# Patient Record
Sex: MALE | Race: WHITE | NOT HISPANIC OR LATINO | URBAN - METROPOLITAN AREA
[De-identification: names, ages, dates, MRNs, and addresses within clinical notes are randomized per-mention and may not be internally consistent; named-entity substitution may affect disease eponyms.]

---

## 2023-08-18 ENCOUNTER — HOSPITAL ENCOUNTER (OUTPATIENT)
Dept: DATA CONVERSION | Facility: HOSPITAL | Age: 68
Discharge: HOME | End: 2023-08-18

## 2023-08-18 DIAGNOSIS — I71.21 ANEURYSM OF THE ASCENDING AORTA, WITHOUT RUPTURE (CMS-HCC): ICD-10-CM

## 2023-08-29 ENCOUNTER — HOSPITAL ENCOUNTER (OUTPATIENT)
Dept: DATA CONVERSION | Facility: HOSPITAL | Age: 68
Discharge: HOME | End: 2023-08-29

## 2023-08-29 DIAGNOSIS — R26.0 ATAXIC GAIT: ICD-10-CM

## 2024-11-09 ENCOUNTER — LAB (OUTPATIENT)
Dept: LAB | Facility: LAB | Age: 69
End: 2024-11-09
Payer: COMMERCIAL

## 2024-11-09 DIAGNOSIS — E11.65 TYPE 2 DIABETES MELLITUS WITH HYPERGLYCEMIA (MULTI): Primary | ICD-10-CM

## 2024-11-09 DIAGNOSIS — E29.1 TESTICULAR HYPOFUNCTION: ICD-10-CM

## 2024-11-09 LAB
ALBUMIN SERPL BCP-MCNC: 4.3 G/DL (ref 3.4–5)
ALP SERPL-CCNC: 63 U/L (ref 33–136)
ALT SERPL W P-5'-P-CCNC: 17 U/L (ref 10–52)
ANION GAP SERPL CALC-SCNC: 12 MMOL/L (ref 10–20)
AST SERPL W P-5'-P-CCNC: 14 U/L (ref 9–39)
BASOPHILS # BLD AUTO: 0.02 X10*3/UL (ref 0–0.1)
BASOPHILS NFR BLD AUTO: 0.3 %
BILIRUB SERPL-MCNC: 0.9 MG/DL (ref 0–1.2)
BUN SERPL-MCNC: 22 MG/DL (ref 6–23)
CALCIUM SERPL-MCNC: 9.7 MG/DL (ref 8.6–10.6)
CHLORIDE SERPL-SCNC: 104 MMOL/L (ref 98–107)
CHOLEST SERPL-MCNC: 135 MG/DL (ref 0–199)
CHOLESTEROL/HDL RATIO: 3.2
CO2 SERPL-SCNC: 30 MMOL/L (ref 21–32)
CREAT SERPL-MCNC: 1.15 MG/DL (ref 0.5–1.3)
EGFRCR SERPLBLD CKD-EPI 2021: 70 ML/MIN/1.73M*2
EOSINOPHIL # BLD AUTO: 0.08 X10*3/UL (ref 0–0.7)
EOSINOPHIL NFR BLD AUTO: 1.2 %
ERYTHROCYTE [DISTWIDTH] IN BLOOD BY AUTOMATED COUNT: 12.5 % (ref 11.5–14.5)
EST. AVERAGE GLUCOSE BLD GHB EST-MCNC: 146 MG/DL
GLUCOSE SERPL-MCNC: 114 MG/DL (ref 74–99)
HBA1C MFR BLD: 6.7 %
HCT VFR BLD AUTO: 49.5 % (ref 41–52)
HDLC SERPL-MCNC: 42 MG/DL
HGB BLD-MCNC: 16.7 G/DL (ref 13.5–17.5)
IMM GRANULOCYTES # BLD AUTO: 0.03 X10*3/UL (ref 0–0.7)
IMM GRANULOCYTES NFR BLD AUTO: 0.5 % (ref 0–0.9)
LDLC SERPL CALC-MCNC: 62 MG/DL
LDLC SERPL DIRECT ASSAY-MCNC: 59 MG/DL (ref 0–129)
LYMPHOCYTES # BLD AUTO: 1.7 X10*3/UL (ref 1.2–4.8)
LYMPHOCYTES NFR BLD AUTO: 26.2 %
MCH RBC QN AUTO: 31.1 PG (ref 26–34)
MCHC RBC AUTO-ENTMCNC: 33.7 G/DL (ref 32–36)
MCV RBC AUTO: 92 FL (ref 80–100)
MONOCYTES # BLD AUTO: 0.61 X10*3/UL (ref 0.1–1)
MONOCYTES NFR BLD AUTO: 9.4 %
NEUTROPHILS # BLD AUTO: 4.06 X10*3/UL (ref 1.2–7.7)
NEUTROPHILS NFR BLD AUTO: 62.4 %
NON HDL CHOLESTEROL: 93 MG/DL (ref 0–149)
NRBC BLD-RTO: 0 /100 WBCS (ref 0–0)
PLATELET # BLD AUTO: 178 X10*3/UL (ref 150–450)
POTASSIUM SERPL-SCNC: 4.1 MMOL/L (ref 3.5–5.3)
PROT SERPL-MCNC: 6.9 G/DL (ref 6.4–8.2)
RBC # BLD AUTO: 5.37 X10*6/UL (ref 4.5–5.9)
SODIUM SERPL-SCNC: 142 MMOL/L (ref 136–145)
TESTOST SERPL-MCNC: 294 NG/DL (ref 240–1000)
TRIGL SERPL-MCNC: 155 MG/DL (ref 0–149)
VLDL: 31 MG/DL (ref 0–40)
WBC # BLD AUTO: 6.5 X10*3/UL (ref 4.4–11.3)

## 2024-11-09 PROCEDURE — 82570 ASSAY OF URINE CREATININE: CPT

## 2024-11-09 PROCEDURE — 82043 UR ALBUMIN QUANTITATIVE: CPT

## 2024-11-09 PROCEDURE — 83036 HEMOGLOBIN GLYCOSYLATED A1C: CPT

## 2024-11-09 PROCEDURE — 83721 ASSAY OF BLOOD LIPOPROTEIN: CPT

## 2024-11-09 PROCEDURE — 36415 COLL VENOUS BLD VENIPUNCTURE: CPT

## 2024-11-09 PROCEDURE — 84403 ASSAY OF TOTAL TESTOSTERONE: CPT

## 2024-11-09 PROCEDURE — 85025 COMPLETE CBC W/AUTO DIFF WBC: CPT

## 2024-11-09 PROCEDURE — 80053 COMPREHEN METABOLIC PANEL: CPT

## 2024-11-09 PROCEDURE — 80061 LIPID PANEL: CPT

## 2024-11-10 LAB
CREAT UR-MCNC: 84.1 MG/DL (ref 20–370)
MICROALBUMIN UR-MCNC: 69.6 MG/L
MICROALBUMIN/CREAT UR: 82.8 UG/MG CREAT

## 2025-06-16 ENCOUNTER — EVALUATION (OUTPATIENT)
Dept: PHYSICAL THERAPY | Facility: CLINIC | Age: 70
End: 2025-06-16
Payer: COMMERCIAL

## 2025-06-16 DIAGNOSIS — M17.0 BILATERAL PRIMARY OSTEOARTHRITIS OF KNEE: ICD-10-CM

## 2025-06-16 PROCEDURE — 97161 PT EVAL LOW COMPLEX 20 MIN: CPT | Mod: GP | Performed by: PHYSICAL THERAPIST

## 2025-06-16 PROCEDURE — 97530 THERAPEUTIC ACTIVITIES: CPT | Mod: GP | Performed by: PHYSICAL THERAPIST

## 2025-06-16 PROCEDURE — 97110 THERAPEUTIC EXERCISES: CPT | Mod: GP | Performed by: PHYSICAL THERAPIST

## 2025-06-16 ASSESSMENT — ENCOUNTER SYMPTOMS
DEPRESSION: 0
LOSS OF SENSATION IN FEET: 1
OCCASIONAL FEELINGS OF UNSTEADINESS: 1

## 2025-06-16 NOTE — PROGRESS NOTES
Physical Therapy Evaluation and Treatment      Patient Name: Ashley Mcconnell  MRN: [id number]  Today's Date: 6/16/2025  Time Calculation  Start Time: 1520  Stop Time: 1601  Time Calculation (min): 41 min  PT Therapeutic Procedures Time Entry  Therapeutic Exercise Time Entry: 15  Therapeutic Activity Time Entry: 10      Insurance:  Visit number: 1 of 6  Authorization info: 2025: ANTH ISM - NO AUTH / $20 COPAY / $1500 OOP not met / 60V pt/ot - 0 used / [employer] [id number] / ds 6/13/25 //  Insurance Type: Payor: ANTHEM / Plan: HIGHMARK / Product Type: *No Product type* /     Current Problem:   1. Bilateral primary osteoarthritis of knee  Referral to Physical Therapy    Follow Up In Physical Therapy          Subjective    General:  Patient reports he is having pain in both knees, mainly along the insides. Has been getting progressively worse within the past year. Was very active as a kid. MD would like him to work on range of motion. Cortisone injections helped. No mention of knee replacements. Getting in and out of car is uncomfortable, now at least able to drive somewhat longer distances. Walking is limited due to pain and he feels like he changes the way he walks. Sitting more than 30 minutes puts increased pressure on knees. Pain is always waking him up in the middle of the night. No ice or heat. Works on feet all day as  at [employer] which is very difficult and painful. He has to wear harness and tools that can weigh 60# etc. With performance of stairs.         Precautions: none  Pain: 7/10       Objective   ROM   R knee AROM:  Flex 98 deg  Ext 2 deg    L knee AROM:  Flex 103 deg  Ext 1 deg      MMT   B LE strength:  Hip flex 4+/5  Knee flex 4/5  Knee ext 3+/5      Palpation   TTP B patella  TTP R>L medial knee joint line      Flexibility   B quad with moderate tightness  B gastroc with moderate tightness        Transfers   B UE support for sit to stand from low chairs       Gait   Ambulates with decreased  vaishali and antalgic gait. Decreased B heel to toe and decreased B knee flexion. Minimal unsteadiness with B LE in ER.     Stairs   Ascends and descends with step-to-pattern using 1 rail.  Increased time required for descent       Outcome Measures:  LEFS: 23/80      Treatments:  Therapeutic Exercise: Access Code [email or web address]  QS  Bridge  SLR  LAQ      Therapeutic activity:  Educated pt on eval findings and POC  Educated pt on anatomy of knees  Discussed work positioning, etc      Assessment   Assessment:   Pt is a 70 y.o. male with bilateral knee osteoarthritis. Pt with gait deficits, decreased AROM, reduced strength, and flexibility limitations. Pt will benefit from skilled PT to address the above deficits for improvement in functional activities.         Low complexity due to patient's clinical presentation being stable and uncomplicated by any significant comorbidities that may affect rehab tolerance and progression.     Plan:   Treatment/Interventions: Education/ Instruction, Gait training, Manual therapy, Neuromuscular re-education, Self care/ home management, Taping techniques, Therapeutic activities, Therapeutic exercises  PT Plan: Skilled PT  PT Frequency: 1 time per week  Duration: 5 more visits  Number of Treatments Authorized: 60  Rehab Potential: Good  Plan of Care Agreement: Patient      Goals:   Active       Mobility       Goal 1       Start:  06/16/25    Expected End:  09/14/25       Pt will improve B knee AROM to 90% of WNL to improve I/ADLs         Goal 2       Start:  06/16/25    Expected End:  09/14/25       Pt will increase B LE strength to 5/5 to improve I/ADLs            Pain       Goal 1       Start:  06/16/25    Expected End:  09/14/25       Pt will perform all work tasks with <3/10 pain         Goal 2       Start:  06/16/25    Expected End:  09/14/25       Pt will stand/walk >30 min with <3/10 pain and minimal deficits to to improve I/ADLs.

## 2025-06-19 ENCOUNTER — HOSPITAL ENCOUNTER (OUTPATIENT)
Dept: RADIOLOGY | Facility: HOSPITAL | Age: 70
Discharge: HOME | End: 2025-06-19
Payer: COMMERCIAL

## 2025-06-19 DIAGNOSIS — G24.3 SPASMODIC TORTICOLLIS: ICD-10-CM

## 2025-06-19 PROCEDURE — 72050 X-RAY EXAM NECK SPINE 4/5VWS: CPT | Performed by: RADIOLOGY

## 2025-06-19 PROCEDURE — 72050 X-RAY EXAM NECK SPINE 4/5VWS: CPT

## 2025-06-25 ENCOUNTER — TREATMENT (OUTPATIENT)
Dept: PHYSICAL THERAPY | Facility: CLINIC | Age: 70
End: 2025-06-25
Payer: COMMERCIAL

## 2025-06-25 DIAGNOSIS — M17.0 BILATERAL PRIMARY OSTEOARTHRITIS OF KNEE: ICD-10-CM

## 2025-06-25 PROCEDURE — 97110 THERAPEUTIC EXERCISES: CPT | Mod: GP | Performed by: PHYSICAL THERAPIST

## 2025-06-25 NOTE — PROGRESS NOTES
Physical Therapy Treatment    Patient Name: Ashley Mcconnell  MRN: [id number]  Today's Date: 6/25/2025  Time Calculation  Start Time: 1520  Stop Time: 1600  Time Calculation (min): 40 min  PT Therapeutic Procedures Time Entry  Therapeutic Exercise Time Entry: 40    Insurance:  Visit number: 2 of 6  Authorization info: 2025: ANTH ISM - NO AUTH / $20 COPAY / $1500 OOP not met / 60V pt/ot - 0 used / [employer] [id number] / ds 6/13/25 //  Insurance Type: Payor: HOLLIS / Plan: HIGHMARK / Product Type: *No Product type* /     Current Problem   1. Bilateral primary osteoarthritis of knee  Follow Up In Physical Therapy          Subjective   General   Patient reports he had a really rough 3 days at work. 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 Is having a lot of pain in both legs/knees and cramping.       Precautions: none  Pain 4/10  Post Treatment Pain Level 3/10    Objective   Moderate tightness in B hamstrings and gastrocs, one spasm during session in R hamstrings    Treatments:  Therapeutic Exercise:  NuStep L5 x6 minutes  Gastroc stretch at wedge, 30 seconds x 3  Stand hamstring stretch, 30 seconds x 3 ea R/L  B knee flexion at step, 2x10 ea R/L  Heel raises 3x10        Assessment   Assessment:   Pt tolerated there ex progressions without increases in pain/symptoms. Moderate B flexibility restrictions resulting in decreased activation of B LE musculature.       Plan: gentle mobility and strength    OP EDUCATION: importance of taking breaks throughout day       Goals:   Active       Mobility       Goal 1       Start:  06/16/25    Expected End:  09/14/25       Pt will improve B knee AROM to 90% of WNL to improve I/ADLs         Goal 2       Start:  06/16/25    Expected End:  09/14/25       Pt will increase B LE strength to 5/5 to improve I/ADLs            Pain       Goal 1       Start:  06/16/25    Expected End:  09/14/25       Pt will perform all work tasks with <3/10 pain         Goal 2       Start:   06/16/25    Expected End:  09/14/25       Pt will stand/walk >30 min with <3/10 pain and minimal deficits to to improve I/ADLs.

## 2025-06-30 ENCOUNTER — TREATMENT (OUTPATIENT)
Dept: PHYSICAL THERAPY | Facility: CLINIC | Age: 70
End: 2025-06-30
Payer: COMMERCIAL

## 2025-06-30 DIAGNOSIS — M17.0 BILATERAL PRIMARY OSTEOARTHRITIS OF KNEE: ICD-10-CM

## 2025-06-30 PROCEDURE — 97110 THERAPEUTIC EXERCISES: CPT | Mod: GP | Performed by: PHYSICAL THERAPIST

## 2025-06-30 NOTE — PROGRESS NOTES
"Physical Therapy Treatment    Patient Name: Ashley Mcconnell  MRN: [id number]  Today's Date: 6/30/2025  Time Calculation  Start Time: 1525  Stop Time: 1605  Time Calculation (min): 40 min  PT Therapeutic Procedures Time Entry  Therapeutic Exercise Time Entry: 40    Insurance:  Visit number: 3 of 6  Authorization info: 2025: ANTH ISM - NO AUTH / $20 COPAY / $1500 OOP not met / 60V pt/ot - 0 used / [employer] [id number] / ds 6/13/25 //  Insurance Type: Payor: HOLLIS / Plan: HIGHMARK / Product Type: *No Product type* /    Current Problem   1. Bilateral primary osteoarthritis of knee  Follow Up In Physical Therapy          Subjective   General   \"I think cortisone injections are starting to wear off.\" Last night was the first night where pain would not go away in the knees.       Precautions: none  Pain 5/10  Post Treatment Pain Level 5/10    Objective   B UE support for sit to stand from normal chair       Treatments:  Therapeutic Exercise:  NuStep L5 x6 minutes  Gastroc stretch at wedge, 30 seconds x 3  Stand hamstring stretch, 30 seconds x 3 ea R/L  B knee flexion at step, 2x10 ea R/L  Stand hip ABD 2x10 ea R/L   Steps 12inch 2x10 ea R/L   B heel raises off step, 2x10       Assessment   Assessment:   Slight favoring of right LE during standing exercises due to increased symptoms here vs. Left LE. Poor postural alignment with cues needed to correct.       Plan: strength    OP EDUCATION: HEP    Goals:   Active       Mobility       Goal 1       Start:  06/16/25    Expected End:  09/14/25       Pt will improve B knee AROM to 90% of WNL to improve I/ADLs         Goal 2       Start:  06/16/25    Expected End:  09/14/25       Pt will increase B LE strength to 5/5 to improve I/ADLs            Pain       Goal 1       Start:  06/16/25    Expected End:  09/14/25       Pt will perform all work tasks with <3/10 pain         Goal 2       Start:  06/16/25    Expected End:  09/14/25       Pt will stand/walk >30 min with <3/10 pain and " minimal deficits to to improve I/ADLs.

## 2025-07-07 ENCOUNTER — TREATMENT (OUTPATIENT)
Dept: PHYSICAL THERAPY | Facility: CLINIC | Age: 70
End: 2025-07-07
Payer: COMMERCIAL

## 2025-07-07 DIAGNOSIS — M17.0 BILATERAL PRIMARY OSTEOARTHRITIS OF KNEE: ICD-10-CM

## 2025-07-07 PROCEDURE — 97110 THERAPEUTIC EXERCISES: CPT | Mod: GP | Performed by: PHYSICAL THERAPIST

## 2025-07-07 NOTE — PROGRESS NOTES
Physical Therapy Treatment    Patient Name: Ashley Mcconnell  MRN: [id number]  Today's Date: 7/7/2025  Time Calculation  Start Time: 1540  Stop Time: 1620  Time Calculation (min): 40 min  PT Therapeutic Procedures Time Entry  Therapeutic Exercise Time Entry: 40    Insurance:  Visit number: 4 of 6  Authorization info: 2025: ANTH ISM - NO AUTH / $20 COPAY / $1500 OOP not met / 60V pt/ot - 0 used / [employer] [id number] / ds 6/13/25 //  Insurance Type: Payor: HOLLIS / Plan: HIGHMARK / Product Type: *No Product type*     Current Problem   1. Bilateral primary osteoarthritis of knee  Follow Up In Physical Therapy          Subjective   General   Able to do a lot of yardwork prior to appointment. Didn't have to work today and thinks that helped his knees as he is having less pain.      Precautions: none  Pain 3/10  Post Treatment Pain Level 3/10    Objective   Right quad strength: 4+/5  Left quad strength: 4+/5      Treatments:  Therapeutic Exercise:  NuStep L5 x6 minutes LE only  Gastroc stretch at wedge, 30 seconds x 3  B knee flexion at step, 2x10 ea R/L  FWD Steps 12inch 2x10 ea R/L   LAT Steps 6 inch 2x10 ea R/L   B heel raises off step, 3x10  Side steps red loop 20 feet x 4  Zig zags red loop 20 feet x 4   Mini lateral lunge 2x10 ea R/L   Mini FWD lunge 2x10 ea R/L        Assessment   Assessment:   Able to add resistance band exercises for further LE strengthening this date. Initial cues required for technique to prevent LE in ER due to overall glute weakness.         Plan: LE strengthening - weight machines, etc.     OP EDUCATION: no changes    Goals:   Active       Mobility       Goal 1       Start:  06/16/25    Expected End:  09/14/25       Pt will improve B knee AROM to 90% of WNL to improve I/ADLs         Goal 2       Start:  06/16/25    Expected End:  09/14/25       Pt will increase B LE strength to 5/5 to improve I/ADLs            Pain       Goal 1       Start:  06/16/25    Expected End:  09/14/25       Pt will  perform all work tasks with <3/10 pain         Goal 2       Start:  06/16/25    Expected End:  09/14/25       Pt will stand/walk >30 min with <3/10 pain and minimal deficits to to improve I/ADLs.

## 2025-07-14 ENCOUNTER — TREATMENT (OUTPATIENT)
Dept: PHYSICAL THERAPY | Facility: CLINIC | Age: 70
End: 2025-07-14
Payer: COMMERCIAL

## 2025-07-14 DIAGNOSIS — M17.0 BILATERAL PRIMARY OSTEOARTHRITIS OF KNEE: ICD-10-CM

## 2025-07-14 PROCEDURE — 97110 THERAPEUTIC EXERCISES: CPT | Mod: GP | Performed by: PHYSICAL THERAPIST

## 2025-07-14 NOTE — PROGRESS NOTES
Physical Therapy Treatment    Patient Name: Ashley Mcconnell  MRN: [id number]  Today's Date: 7/14/2025  Time Calculation  Start Time: 1521  Stop Time: 1601  Time Calculation (min): 40 min  PT Therapeutic Procedures Time Entry  Therapeutic Exercise Time Entry: 40    Insurance:  Visit number: 5 of 6  Authorization info: 2025: ANTH ISM - NO AUTH / $20 COPAY / $1500 OOP not met / 60V pt/ot - 0 used / [employer] [id number] / ds 6/13/25 //  Insurance Type: Payor: HOLLIS / Plan: HIGHMARK / Product Type: *No Product type*     Current Problem   1. Bilateral primary osteoarthritis of knee  Follow Up In Physical Therapy            Subjective   General   Did a lot of yardwork this weekend and has been working long shifts at work. Left knee is bothering patient more today. Driving golf cart around work has been very uncomfortable due to small space.       Precautions: none   Pain 5/10  Post Treatment Pain Level 4/10    Objective   B UE support for sit to stand from low chair surface       Treatments:  Therapeutic Exercise:  NuStep L5 x6 minutes LE only  Gastroc stretch at wedge, 30 seconds x 3  B knee flexion at step, 2x10 ea R/L  FWD Steps 12inch 2x10 ea R/L   LAT Steps 6 inch 2x10 ea R/L   Side steps red loop 20 feet x 4  Zig zags red loop 20 feet x 4   B Leg press 240# 2x10  SL hamstring curl 50# 2x10 ea R/L        NOT TODAY:   Mini lateral lunge 2x10 ea R/L   Mini FWD lunge 2x10 ea R/L      Assessment   Assessment:   Able to progress to weight machine exercises for increased strengthening of B LE. Only initial cues required for correct technique.         Plan:  reassess next session    OP EDUCATION: focus on postioning of B knee at work      Goals:   Active       Mobility       Goal 1       Start:  06/16/25    Expected End:  09/14/25       Pt will improve B knee AROM to 90% of WNL to improve I/ADLs         Goal 2       Start:  06/16/25    Expected End:  09/14/25       Pt will increase B LE strength to 5/5 to improve I/ADLs             Pain       Goal 1       Start:  06/16/25    Expected End:  09/14/25       Pt will perform all work tasks with <3/10 pain         Goal 2       Start:  06/16/25    Expected End:  09/14/25       Pt will stand/walk >30 min with <3/10 pain and minimal deficits to to improve I/ADLs.

## 2025-07-28 ENCOUNTER — TREATMENT (OUTPATIENT)
Dept: PHYSICAL THERAPY | Facility: CLINIC | Age: 70
End: 2025-07-28
Payer: COMMERCIAL

## 2025-07-28 DIAGNOSIS — M17.0 BILATERAL PRIMARY OSTEOARTHRITIS OF KNEE: ICD-10-CM

## 2025-07-28 PROCEDURE — 97110 THERAPEUTIC EXERCISES: CPT | Mod: GP | Performed by: PHYSICAL THERAPIST

## 2025-07-28 NOTE — PROGRESS NOTES
Physical Therapy Treatment/Discharge Report    Patient Name: Ashley Mcconnell  MRN: [id number]  Today's Date: 7/28/2025  Time Calculation  Start Time: 1520  Stop Time: 1600  Time Calculation (min): 40 min  PT Therapeutic Procedures Time Entry  Therapeutic Exercise Time Entry: 40    Insurance:  Visit number: 6 of 6  Authorization info: 2025: ANTH ISM - NO AUTH / $20 COPAY / $1500 OOP not met / 60V pt/ot - 0 used / [employer] [id number] / ds 6/13/25 //  Insurance Type: Payor: HOLLIS / Plan: HIGHMARK / Product Type: *No Product type*     Current Problem   1. Bilateral primary osteoarthritis of knee  Follow Up In Physical Therapy          Subjective   General   Reports today he is actually having much less pain in both knees. Last week he had a hard time sleeping, unsure as to why.       Precautions: none  Pain 3/10  Post Treatment Pain Level 2/10    Objective   R knee AROM:  Flex 112 deg  Ext 8 deg     L knee AROM:  Flex 112 deg   Ext 12 deg     B LE strength:  Hip flex 5/5  Knee flex 5/5  Knee ext 5/5    Flexibility: Mild B hamstring tightness, Mild B hip flexor tightness   Gait: Ambulates with mildly decreased vaishali with slight B knee flexion. B LE in ER with moderate forward head protracted scapulae.       Treatments:  Therapeutic Exercise:  NuStep L5 x6 minutes LE only  Gastroc stretch at wedge, 30 seconds x 3  B knee flexion at step, 2x10 ea R/L  FWD Steps 12inch 2x10 ea R/L   LAT Steps 6 inch 2x10 ea R/L   Side steps red loop 20 feet x 4  Zig zags red loop 20 feet x 4   QS 3x10 ea R/L        NOT TODAY:   B Leg press 240# 2x10  SL hamstring curl 50# 2x10 ea R/L  Mini lateral lunge 2x10 ea R/L   Mini FWD lunge 2x10 ea R/L      Assessment   Assessment:   Pt with good progress during therapy, however goals remain partially met. He has good strength in B LE, however remains with gait deficits due to lacking extension and pain (especially with difficult work tasks). Pt to return to MD for follow-up to explore all options.  He will be discharged at this time and continue with HEP on his own.       Plan: discharge, return to MD    OP EDUCATION: continue with HEP on own    Goals:   Resolved       Mobility       Goal 1 (Met)       Start:  06/16/25    Expected End:  09/14/25    Resolved:  07/28/25    Pt will improve B knee AROM to 90% of WNL to improve I/ADLs         Goal 2 (Met)       Start:  06/16/25    Expected End:  09/14/25    Resolved:  07/28/25    Pt will increase B LE strength to 5/5 to improve I/ADLs            Pain       Goal 1 (Progressing)       Start:  06/16/25    Expected End:  09/14/25    Resolved:  07/28/25    Pt will perform all work tasks with <3/10 pain         Goal 2 (Met)       Start:  06/16/25    Expected End:  09/14/25    Resolved:  07/28/25    Pt will stand/walk >30 min with <3/10 pain and minimal deficits to to improve I/ADLs.

## 2025-08-12 ENCOUNTER — HOSPITAL ENCOUNTER (OUTPATIENT)
Dept: RADIOLOGY | Facility: CLINIC | Age: 70
Discharge: HOME | End: 2025-08-12
Payer: MEDICARE

## 2025-08-12 DIAGNOSIS — S43.90XA: ICD-10-CM

## 2025-08-12 DIAGNOSIS — M50.320 OTHER CERVICAL DISC DEGENERATION, MID-CERVICAL REGION, UNSPECIFIED LEVEL: ICD-10-CM

## 2025-08-12 DIAGNOSIS — M47.22 OTHER SPONDYLOSIS WITH RADICULOPATHY, CERVICAL REGION: ICD-10-CM

## 2025-08-12 DIAGNOSIS — S13.4XXA SPRAIN OF LIGAMENTS OF CERVICAL SPINE, INITIAL ENCOUNTER: ICD-10-CM

## 2025-08-12 PROCEDURE — 72141 MRI NECK SPINE W/O DYE: CPT | Performed by: RADIOLOGY

## 2025-08-12 PROCEDURE — 72141 MRI NECK SPINE W/O DYE: CPT

## 2025-09-05 ENCOUNTER — OFFICE VISIT (OUTPATIENT)
Dept: VASCULAR SURGERY | Facility: CLINIC | Age: 70
End: 2025-09-05
Payer: COMMERCIAL

## 2025-09-05 VITALS
RESPIRATION RATE: 18 BRPM | BODY MASS INDEX: 33.47 KG/M2 | WEIGHT: 275 LBS | HEART RATE: 54 BPM | DIASTOLIC BLOOD PRESSURE: 93 MMHG | SYSTOLIC BLOOD PRESSURE: 137 MMHG

## 2025-09-05 DIAGNOSIS — I83.893 VARICOSE VEINS OF BILATERAL LOWER EXTREMITIES WITH OTHER COMPLICATIONS: Primary | ICD-10-CM

## 2025-09-05 PROCEDURE — 1159F MED LIST DOCD IN RCRD: CPT | Performed by: NURSE PRACTITIONER

## 2025-09-05 PROCEDURE — 1125F AMNT PAIN NOTED PAIN PRSNT: CPT | Performed by: NURSE PRACTITIONER

## 2025-09-05 PROCEDURE — 99213 OFFICE O/P EST LOW 20 MIN: CPT | Performed by: NURSE PRACTITIONER

## 2025-09-05 PROCEDURE — 1036F TOBACCO NON-USER: CPT | Performed by: NURSE PRACTITIONER

## 2025-09-05 PROCEDURE — 99203 OFFICE O/P NEW LOW 30 MIN: CPT | Performed by: NURSE PRACTITIONER

## 2025-09-05 PROCEDURE — 1160F RVW MEDS BY RX/DR IN RCRD: CPT | Performed by: NURSE PRACTITIONER

## 2025-09-05 RX ORDER — ATORVASTATIN CALCIUM 20 MG/1
20 TABLET, FILM COATED ORAL DAILY
COMMUNITY

## 2025-09-05 RX ORDER — SEMAGLUTIDE 1.34 MG/ML
0.25 INJECTION, SOLUTION SUBCUTANEOUS WEEKLY
COMMUNITY

## 2025-09-05 RX ORDER — GLIMEPIRIDE 1 MG/1
1 TABLET ORAL 2 TIMES DAILY
COMMUNITY

## 2025-09-05 RX ORDER — DAPAGLIFLOZIN 10 MG/1
10 TABLET, FILM COATED ORAL EVERY 24 HOURS
COMMUNITY

## 2025-09-05 RX ORDER — LISINOPRIL AND HYDROCHLOROTHIAZIDE 20; 25 MG/1; MG/1
1 TABLET ORAL DAILY
COMMUNITY

## 2025-09-05 RX ORDER — METFORMIN HYDROCHLORIDE 500 MG/1
500 TABLET ORAL
COMMUNITY

## 2025-09-05 ASSESSMENT — PATIENT HEALTH QUESTIONNAIRE - PHQ9
SUM OF ALL RESPONSES TO PHQ9 QUESTIONS 1 AND 2: 0
1. LITTLE INTEREST OR PLEASURE IN DOING THINGS: NOT AT ALL
2. FEELING DOWN, DEPRESSED OR HOPELESS: NOT AT ALL

## 2025-09-05 ASSESSMENT — ENCOUNTER SYMPTOMS
DEPRESSION: 0
OCCASIONAL FEELINGS OF UNSTEADINESS: 1
LOSS OF SENSATION IN FEET: 0

## 2025-09-05 ASSESSMENT — PAIN SCALES - GENERAL: PAINLEVEL_OUTOF10: 5
